# Patient Record
Sex: FEMALE | Race: WHITE | NOT HISPANIC OR LATINO | Employment: STUDENT | URBAN - METROPOLITAN AREA
[De-identification: names, ages, dates, MRNs, and addresses within clinical notes are randomized per-mention and may not be internally consistent; named-entity substitution may affect disease eponyms.]

---
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
## 2023-05-03 ENCOUNTER — OFFICE VISIT (OUTPATIENT)
Dept: URGENT CARE | Facility: CLINIC | Age: 18
End: 2023-05-03
Payer: OTHER GOVERNMENT

## 2023-05-03 VITALS
HEART RATE: 62 BPM | DIASTOLIC BLOOD PRESSURE: 60 MMHG | WEIGHT: 125 LBS | RESPIRATION RATE: 16 BRPM | SYSTOLIC BLOOD PRESSURE: 129 MMHG | BODY MASS INDEX: 20.09 KG/M2 | OXYGEN SATURATION: 96 % | HEIGHT: 66 IN | TEMPERATURE: 98 F

## 2023-05-03 DIAGNOSIS — S63.630A SPRAIN OF INTERPHALANGEAL JOINT OF RIGHT INDEX FINGER, INITIAL ENCOUNTER: Primary | ICD-10-CM

## 2023-05-03 PROCEDURE — 99213 PR OFFICE/OUTPT VISIT, EST, LEVL III, 20-29 MIN: ICD-10-PCS | Mod: S$GLB,,,

## 2023-05-03 PROCEDURE — 99213 OFFICE O/P EST LOW 20 MIN: CPT | Mod: S$GLB,,,

## 2023-05-03 NOTE — PROGRESS NOTES
"Subjective:      Patient ID: Brigida Sadler is a 17 y.o. female.    Vitals:  height is 5' 6" (1.676 m) and weight is 56.7 kg (125 lb). Her temperature is 98.4 °F (36.9 °C). Her blood pressure is 129/60 and her pulse is 62. Her respiration is 16 and oxygen saturation is 96%.     Chief Complaint: Hand Injury (Right index finger)    Brigida Sadler is a 17 y.o. female who presents for R index finger injury which onset 3 weeks ago. Patient injured finger playing volleyball. She states her hand jammed into another person. Pain is 6/10 in severity. Pain does not radiate. Associated sxs include swelling and pain with bending. Patient denies any fever, chills, SOB, CP, n/v/d, weakness, or numbness/tingling. Prior Tx includes fidelia taping with no relief.    Hand Injury   Her dominant hand is their right hand. Incident onset: appx 3 weeks ago. The injury mechanism was a direct blow. Pain location: right index finger. The pain does not radiate. The pain is at a severity of 6/10. The pain has been Constant since the incident. Associated symptoms include tingling. Pertinent negatives include no chest pain, muscle weakness or numbness. The symptoms are aggravated by movement. She has tried ice (tape) for the symptoms. The treatment provided no relief.     Constitution: Negative for chills and fever.   Cardiovascular:  Negative for chest pain.   Musculoskeletal:  Positive for pain (R index) and joint swelling. Negative for trauma, joint pain, abnormal ROM of joint, muscle cramps and muscle ache.   Neurological:  Negative for numbness and tingling.    Objective:     Physical Exam   Constitutional: She is oriented to person, place, and time. She appears well-developed. She is cooperative. No distress.   HENT:   Head: Normocephalic and atraumatic.   Nose: Nose normal.   Mouth/Throat: Oropharynx is clear and moist and mucous membranes are normal.   Eyes: Conjunctivae and lids are normal.   Neck: Trachea normal and phonation " normal. Neck supple.   Cardiovascular: Normal rate, regular rhythm, normal heart sounds and normal pulses.   Pulmonary/Chest: Effort normal and breath sounds normal.   Abdominal: Normal appearance and bowel sounds are normal. She exhibits no mass. Soft.   Musculoskeletal:         General: No deformity.        Hands:       Comments: Tenderness and swelling to the medial aspect of the PIP of the R index finger. Mild erythema. Joint laxity. Full active and passive ROM. No DIP or MCP joint tenderness. Sensation intact. 2+ radial pulse. NVI.    Neurological: She is alert and oriented to person, place, and time. She has normal strength and normal reflexes. No sensory deficit.   Skin: Skin is warm, dry, intact and not diaphoretic.   Psychiatric: Her speech is normal and behavior is normal. Judgment and thought content normal.   Nursing note and vitals reviewed.    Assessment:     1. Sprain of interphalangeal joint of right index finger, initial encounter        Plan:       Sprain of interphalangeal joint of right index finger, initial encounter      Afebrile. VSS.  Sprain of PIP of R index finger.  There is joint laxity, concerns for ligamentous injury.  Finger splint placed in clinic.  Recommend RICE therapy.  Tylenol/Ibuprofen as needed for pain.  Follow-up with Orthopedics for further evaluation. Patient states she will just follow up with Orthopedist back in her home town.  RTC for any worsening symptoms.

## 2023-05-03 NOTE — PATIENT INSTRUCTIONS
PRICE therapy:  Protect the joint with stabilizing brace like a neoprene sleeve or taping.   Rest the extremity--limit activity with this area  Ice 2-3 times a day for 20 minute intervals for first 48 hours or until inflammation has stabilized   Compression to provide support and help prevent swelling  Elevation above heart level to help decrease swelling.    Tylenol/Ibuprofen as needed for pain.  Follow up with Orthopedics as needed for further evaluation.